# Patient Record
Sex: MALE | Race: OTHER | URBAN - METROPOLITAN AREA
[De-identification: names, ages, dates, MRNs, and addresses within clinical notes are randomized per-mention and may not be internally consistent; named-entity substitution may affect disease eponyms.]

---

## 2022-10-27 ENCOUNTER — EMERGENCY (EMERGENCY)
Facility: HOSPITAL | Age: 52
LOS: 0 days | Discharge: HOME | End: 2022-10-27
Attending: EMERGENCY MEDICINE | Admitting: EMERGENCY MEDICINE

## 2022-10-27 VITALS
HEART RATE: 66 BPM | DIASTOLIC BLOOD PRESSURE: 82 MMHG | SYSTOLIC BLOOD PRESSURE: 173 MMHG | RESPIRATION RATE: 17 BRPM | OXYGEN SATURATION: 98 % | TEMPERATURE: 98 F | WEIGHT: 180.78 LBS

## 2022-10-27 DIAGNOSIS — H57.12 OCULAR PAIN, LEFT EYE: ICD-10-CM
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
## 2022-10-27 DIAGNOSIS — S05.02XA INJURY OF CONJUNCTIVA AND CORNEAL ABRASION WITHOUT FOREIGN BODY, LEFT EYE, INITIAL ENCOUNTER: ICD-10-CM

## 2022-10-27 DIAGNOSIS — H57.89 OTHER SPECIFIED DISORDERS OF EYE AND ADNEXA: ICD-10-CM
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
## 2022-10-27 DIAGNOSIS — F17.200 NICOTINE DEPENDENCE, UNSPECIFIED, UNCOMPLICATED: ICD-10-CM

## 2022-10-27 PROCEDURE — 99283 EMERGENCY DEPT VISIT LOW MDM: CPT

## 2022-10-27 RX ORDER — POLYMYXIN B SULF/TRIMETHOPRIM 10000-1/ML
1 DROPS OPHTHALMIC (EYE) ONCE
Refills: 0 | Status: COMPLETED | OUTPATIENT
Start: 2022-10-27 | End: 2022-10-27

## 2022-10-27 RX ORDER — IBUPROFEN 200 MG
600 TABLET ORAL ONCE
Refills: 0 | Status: COMPLETED | OUTPATIENT
Start: 2022-10-27 | End: 2022-10-27

## 2022-10-27 RX ADMIN — Medication 600 MILLIGRAM(S): at 02:31

## 2022-10-27 RX ADMIN — Medication 1 DROP(S): at 02:31

## 2022-10-27 NOTE — ED PROVIDER NOTE - NSFOLLOWUPINSTRUCTIONS_ED_ALL_ED_FT
Corneal Abrasion    Please use given antibiotics eye drop (Polytrim)- 1-2 drops to affected eye every 4-6 hours for 5 days while awake.     The cornea is the clear covering at the front and center of the eye. This very thin tissue is made up of many layers. If a scratch or injury causes the corneal epithelium to come off, it is called a corneal abrasion. Symptoms include eye pain, difficulty keeping eye open, and light sensitivity. Do not drive or operate machinery if your eye is patched.    SEEK MEDICAL CARE IF YOU HAVE THE FOLLOWING SYMPTOMS: discharge from eyes, changes in vision, worsening of symptoms.

## 2022-10-27 NOTE — ED PROVIDER NOTE - CLINICAL SUMMARY MEDICAL DECISION MAKING FREE TEXT BOX
53 yo M, non diabetic, non contact wearer here for L eye pain -- worse with opening eye, blinking, began suddenly now with associated redness, tearing. Found to have corneal abrasion on stain. VS intact. No signs of globe rupture.    Will treat with polytrim. Advised on close monitoring, return precautions, follow up with ophtho.

## 2022-10-27 NOTE — ED PROVIDER NOTE - OBJECTIVE STATEMENT
53 yo male no sig hx present c/o left eye pain/tearing started few hours ago. denies known injury but he was rubbing his eye because the smoke of his cigarette was irritating his eye. light worsen the pain. denies HA/dizziness/ change of vision/ discharge.

## 2022-10-27 NOTE — ED PROVIDER NOTE - PATIENT PORTAL LINK FT
You can access the FollowMyHealth Patient Portal offered by [facility] by registering at the following website: [email or web address]. By joining [employer]’s FollowMyHealth portal, you will also be able to view your health information using other applications (apps) compatible with our system.

## 2022-10-27 NOTE — ED PROVIDER NOTE - PHYSICAL EXAMINATION
CONSTITUTIONAL: Well-appearing; well-nourished; in no apparent distress.   EYES: PERRL; EOM intact. VA OD 20/25 OS 20/25 OU 20/25  + small linear fluorescent uptake to the center of left cornea. negative Jennifer signs   SKIN: No skin changes to left eyelid. no FB noted underneath upper/lower eyelids   NEURO/PSYCH: A & O x 4; grossly unremarkable.

## 2022-10-27 NOTE — ED PROVIDER NOTE - NSFOLLOWUPCLINICS_GEN_ALL_ED_FT
[facility]  Ophthalmolgy  [address], [address]  [place] [address]  Phone: [phone]  Fax:

## 2023-03-12 ENCOUNTER — EMERGENCY (EMERGENCY)
Facility: HOSPITAL | Age: 53
LOS: 0 days | Discharge: HOME | End: 2023-03-13
Attending: EMERGENCY MEDICINE
Payer: MEDICAID

## 2023-03-12 VITALS
HEART RATE: 119 BPM | SYSTOLIC BLOOD PRESSURE: 182 MMHG | WEIGHT: 199.96 LBS | OXYGEN SATURATION: 96 % | HEIGHT: 68 IN | DIASTOLIC BLOOD PRESSURE: 105 MMHG | RESPIRATION RATE: 18 BRPM

## 2023-03-12 DIAGNOSIS — R07.89 OTHER CHEST PAIN: ICD-10-CM

## 2023-03-12 DIAGNOSIS — R42 DIZZINESS AND GIDDINESS: ICD-10-CM

## 2023-03-12 DIAGNOSIS — E83.42 HYPOMAGNESEMIA: ICD-10-CM

## 2023-03-12 DIAGNOSIS — Z20.822 CONTACT WITH AND (SUSPECTED) EXPOSURE TO COVID-19: ICD-10-CM

## 2023-03-12 DIAGNOSIS — F17.290 NICOTINE DEPENDENCE, OTHER TOBACCO PRODUCT, UNCOMPLICATED: ICD-10-CM

## 2023-03-12 DIAGNOSIS — R07.9 CHEST PAIN, UNSPECIFIED: ICD-10-CM

## 2023-03-12 DIAGNOSIS — R20.2 PARESTHESIA OF SKIN: ICD-10-CM

## 2023-03-12 DIAGNOSIS — F10.10 ALCOHOL ABUSE, UNCOMPLICATED: ICD-10-CM

## 2023-03-12 LAB
ALBUMIN SERPL ELPH-MCNC: 4.5 G/DL — SIGNIFICANT CHANGE UP (ref 3.5–5.2)
ALP SERPL-CCNC: 64 U/L — SIGNIFICANT CHANGE UP (ref 30–115)
ALT FLD-CCNC: 29 U/L — SIGNIFICANT CHANGE UP (ref 0–41)
ANION GAP SERPL CALC-SCNC: 12 MMOL/L — SIGNIFICANT CHANGE UP (ref 7–14)
APPEARANCE UR: CLEAR — SIGNIFICANT CHANGE UP
APTT BLD: 27.7 SEC — SIGNIFICANT CHANGE UP (ref 27–39.2)
AST SERPL-CCNC: 20 U/L — SIGNIFICANT CHANGE UP (ref 0–41)
BASE EXCESS BLDV CALC-SCNC: 2.7 MMOL/L — SIGNIFICANT CHANGE UP (ref -2–3)
BASOPHILS # BLD AUTO: 0.08 K/UL — SIGNIFICANT CHANGE UP (ref 0–0.2)
BASOPHILS NFR BLD AUTO: 0.9 % — SIGNIFICANT CHANGE UP (ref 0–1)
BILIRUB SERPL-MCNC: 0.3 MG/DL — SIGNIFICANT CHANGE UP (ref 0.2–1.2)
BILIRUB UR-MCNC: NEGATIVE — SIGNIFICANT CHANGE UP
BUN SERPL-MCNC: 14 MG/DL — SIGNIFICANT CHANGE UP (ref 10–20)
CA-I SERPL-SCNC: 1.22 MMOL/L — SIGNIFICANT CHANGE UP (ref 1.15–1.33)
CALCIUM SERPL-MCNC: 9.4 MG/DL — SIGNIFICANT CHANGE UP (ref 8.4–10.5)
CHLORIDE SERPL-SCNC: 102 MMOL/L — SIGNIFICANT CHANGE UP (ref 98–110)
CO2 SERPL-SCNC: 23 MMOL/L — SIGNIFICANT CHANGE UP (ref 17–32)
COLOR SPEC: COLORLESS — SIGNIFICANT CHANGE UP
CREAT SERPL-MCNC: 0.9 MG/DL — SIGNIFICANT CHANGE UP (ref 0.7–1.5)
DIFF PNL FLD: NEGATIVE — SIGNIFICANT CHANGE UP
EGFR: 101 ML/MIN/1.73M2 — SIGNIFICANT CHANGE UP
EOSINOPHIL # BLD AUTO: 0.04 K/UL — SIGNIFICANT CHANGE UP (ref 0–0.7)
EOSINOPHIL NFR BLD AUTO: 0.4 % — SIGNIFICANT CHANGE UP (ref 0–8)
FLUAV AG NPH QL: SIGNIFICANT CHANGE UP
FLUBV AG NPH QL: SIGNIFICANT CHANGE UP
GAS PNL BLDV: 134 MMOL/L — LOW (ref 136–145)
GAS PNL BLDV: SIGNIFICANT CHANGE UP
GLUCOSE SERPL-MCNC: 191 MG/DL — HIGH (ref 70–99)
GLUCOSE UR QL: NEGATIVE — SIGNIFICANT CHANGE UP
HCO3 BLDV-SCNC: 28 MMOL/L — SIGNIFICANT CHANGE UP (ref 22–29)
HCT VFR BLD CALC: 43.4 % — SIGNIFICANT CHANGE UP (ref 42–52)
HCT VFR BLDA CALC: 45 % — SIGNIFICANT CHANGE UP (ref 39–51)
HGB BLD CALC-MCNC: 15 G/DL — SIGNIFICANT CHANGE UP (ref 12.6–17.4)
HGB BLD-MCNC: 15.5 G/DL — SIGNIFICANT CHANGE UP (ref 14–18)
IMM GRANULOCYTES NFR BLD AUTO: 0.4 % — HIGH (ref 0.1–0.3)
INR BLD: 0.94 RATIO — SIGNIFICANT CHANGE UP (ref 0.65–1.3)
KETONES UR-MCNC: NEGATIVE — SIGNIFICANT CHANGE UP
LACTATE BLDV-MCNC: 2.4 MMOL/L — HIGH (ref 0.5–2)
LEUKOCYTE ESTERASE UR-ACNC: NEGATIVE — SIGNIFICANT CHANGE UP
LYMPHOCYTES # BLD AUTO: 1.74 K/UL — SIGNIFICANT CHANGE UP (ref 1.2–3.4)
LYMPHOCYTES # BLD AUTO: 19.5 % — LOW (ref 20.5–51.1)
MAGNESIUM SERPL-MCNC: 1.7 MG/DL — LOW (ref 1.8–2.4)
MCHC RBC-ENTMCNC: 31.2 PG — HIGH (ref 27–31)
MCHC RBC-ENTMCNC: 35.7 G/DL — SIGNIFICANT CHANGE UP (ref 32–37)
MCV RBC AUTO: 87.3 FL — SIGNIFICANT CHANGE UP (ref 80–94)
MONOCYTES # BLD AUTO: 0.44 K/UL — SIGNIFICANT CHANGE UP (ref 0.1–0.6)
MONOCYTES NFR BLD AUTO: 4.9 % — SIGNIFICANT CHANGE UP (ref 1.7–9.3)
NEUTROPHILS # BLD AUTO: 6.6 K/UL — HIGH (ref 1.4–6.5)
NEUTROPHILS NFR BLD AUTO: 73.9 % — SIGNIFICANT CHANGE UP (ref 42.2–75.2)
NITRITE UR-MCNC: NEGATIVE — SIGNIFICANT CHANGE UP
NRBC # BLD: 0 /100 WBCS — SIGNIFICANT CHANGE UP (ref 0–0)
NT-PROBNP SERPL-SCNC: 21 PG/ML — SIGNIFICANT CHANGE UP (ref 0–300)
PCO2 BLDV: 47 MMHG — SIGNIFICANT CHANGE UP (ref 42–55)
PH BLDV: 7.39 — SIGNIFICANT CHANGE UP (ref 7.32–7.43)
PH UR: 7.5 — SIGNIFICANT CHANGE UP (ref 5–8)
PLATELET # BLD AUTO: 223 K/UL — SIGNIFICANT CHANGE UP (ref 130–400)
PO2 BLDV: 47 MMHG — SIGNIFICANT CHANGE UP
POTASSIUM BLDV-SCNC: 3.9 MMOL/L — SIGNIFICANT CHANGE UP (ref 3.5–5.1)
POTASSIUM SERPL-MCNC: 4.2 MMOL/L — SIGNIFICANT CHANGE UP (ref 3.5–5)
POTASSIUM SERPL-SCNC: 4.2 MMOL/L — SIGNIFICANT CHANGE UP (ref 3.5–5)
PROT SERPL-MCNC: 7 G/DL — SIGNIFICANT CHANGE UP (ref 6–8)
PROT UR-MCNC: NEGATIVE — SIGNIFICANT CHANGE UP
PROTHROM AB SERPL-ACNC: 10.7 SEC — SIGNIFICANT CHANGE UP (ref 9.95–12.87)
RBC # BLD: 4.97 M/UL — SIGNIFICANT CHANGE UP (ref 4.7–6.1)
RBC # FLD: 11.9 % — SIGNIFICANT CHANGE UP (ref 11.5–14.5)
RSV RNA NPH QL NAA+NON-PROBE: SIGNIFICANT CHANGE UP
SAO2 % BLDV: 79 % — SIGNIFICANT CHANGE UP
SARS-COV-2 RNA SPEC QL NAA+PROBE: SIGNIFICANT CHANGE UP
SODIUM SERPL-SCNC: 137 MMOL/L — SIGNIFICANT CHANGE UP (ref 135–146)
SP GR SPEC: 1.03 — SIGNIFICANT CHANGE UP (ref 1.01–1.03)
TROPONIN T SERPL-MCNC: <0.01 NG/ML — SIGNIFICANT CHANGE UP
UROBILINOGEN FLD QL: SIGNIFICANT CHANGE UP
WBC # BLD: 8.94 K/UL — SIGNIFICANT CHANGE UP (ref 4.8–10.8)
WBC # FLD AUTO: 8.94 K/UL — SIGNIFICANT CHANGE UP (ref 4.8–10.8)

## 2023-03-12 PROCEDURE — 71045 X-RAY EXAM CHEST 1 VIEW: CPT | Mod: 26

## 2023-03-12 PROCEDURE — 85025 COMPLETE CBC W/AUTO DIFF WBC: CPT

## 2023-03-12 PROCEDURE — 83735 ASSAY OF MAGNESIUM: CPT

## 2023-03-12 PROCEDURE — 84295 ASSAY OF SERUM SODIUM: CPT

## 2023-03-12 PROCEDURE — 96375 TX/PRO/DX INJ NEW DRUG ADDON: CPT | Mod: XU

## 2023-03-12 PROCEDURE — 83605 ASSAY OF LACTIC ACID: CPT

## 2023-03-12 PROCEDURE — 70496 CT ANGIOGRAPHY HEAD: CPT | Mod: MA

## 2023-03-12 PROCEDURE — 82803 BLOOD GASES ANY COMBINATION: CPT

## 2023-03-12 PROCEDURE — 36415 COLL VENOUS BLD VENIPUNCTURE: CPT

## 2023-03-12 PROCEDURE — 85014 HEMATOCRIT: CPT

## 2023-03-12 PROCEDURE — 0042T: CPT | Mod: MA

## 2023-03-12 PROCEDURE — 85610 PROTHROMBIN TIME: CPT

## 2023-03-12 PROCEDURE — 96365 THER/PROPH/DIAG IV INF INIT: CPT | Mod: XU

## 2023-03-12 PROCEDURE — 83880 ASSAY OF NATRIURETIC PEPTIDE: CPT

## 2023-03-12 PROCEDURE — 85730 THROMBOPLASTIN TIME PARTIAL: CPT

## 2023-03-12 PROCEDURE — 70551 MRI BRAIN STEM W/O DYE: CPT | Mod: MA

## 2023-03-12 PROCEDURE — 99285 EMERGENCY DEPT VISIT HI MDM: CPT | Mod: 25

## 2023-03-12 PROCEDURE — G0378: CPT

## 2023-03-12 PROCEDURE — 96361 HYDRATE IV INFUSION ADD-ON: CPT

## 2023-03-12 PROCEDURE — 93005 ELECTROCARDIOGRAM TRACING: CPT

## 2023-03-12 PROCEDURE — 0241U: CPT

## 2023-03-12 PROCEDURE — 70498 CT ANGIOGRAPHY NECK: CPT | Mod: MA

## 2023-03-12 PROCEDURE — 83036 HEMOGLOBIN GLYCOSYLATED A1C: CPT

## 2023-03-12 PROCEDURE — 81003 URINALYSIS AUTO W/O SCOPE: CPT

## 2023-03-12 PROCEDURE — 80061 LIPID PANEL: CPT

## 2023-03-12 PROCEDURE — 75574 CT ANGIO HRT W/3D IMAGE: CPT | Mod: MA

## 2023-03-12 PROCEDURE — 82962 GLUCOSE BLOOD TEST: CPT

## 2023-03-12 PROCEDURE — 85018 HEMOGLOBIN: CPT

## 2023-03-12 PROCEDURE — 84132 ASSAY OF SERUM POTASSIUM: CPT

## 2023-03-12 PROCEDURE — 80053 COMPREHEN METABOLIC PANEL: CPT

## 2023-03-12 PROCEDURE — 70496 CT ANGIOGRAPHY HEAD: CPT | Mod: 26,MA

## 2023-03-12 PROCEDURE — 82330 ASSAY OF CALCIUM: CPT

## 2023-03-12 PROCEDURE — 70450 CT HEAD/BRAIN W/O DYE: CPT | Mod: 26,MA,59

## 2023-03-12 PROCEDURE — 71045 X-RAY EXAM CHEST 1 VIEW: CPT

## 2023-03-12 PROCEDURE — 70450 CT HEAD/BRAIN W/O DYE: CPT | Mod: MA

## 2023-03-12 PROCEDURE — 96366 THER/PROPH/DIAG IV INF ADDON: CPT

## 2023-03-12 PROCEDURE — 84484 ASSAY OF TROPONIN QUANT: CPT

## 2023-03-12 PROCEDURE — 99223 1ST HOSP IP/OBS HIGH 75: CPT

## 2023-03-12 PROCEDURE — 93010 ELECTROCARDIOGRAM REPORT: CPT

## 2023-03-12 PROCEDURE — 70498 CT ANGIOGRAPHY NECK: CPT | Mod: 26,MA

## 2023-03-12 RX ORDER — KETOROLAC TROMETHAMINE 30 MG/ML
15 SYRINGE (ML) INJECTION ONCE
Refills: 0 | Status: DISCONTINUED | OUTPATIENT
Start: 2023-03-12 | End: 2023-03-12

## 2023-03-12 RX ORDER — SODIUM CHLORIDE 9 MG/ML
1000 INJECTION, SOLUTION INTRAVENOUS ONCE
Refills: 0 | Status: COMPLETED | OUTPATIENT
Start: 2023-03-12 | End: 2023-03-12

## 2023-03-12 RX ORDER — MAGNESIUM SULFATE 500 MG/ML
2 VIAL (ML) INJECTION ONCE
Refills: 0 | Status: COMPLETED | OUTPATIENT
Start: 2023-03-12 | End: 2023-03-12

## 2023-03-12 RX ORDER — ASPIRIN/CALCIUM CARB/MAGNESIUM 324 MG
81 TABLET ORAL DAILY
Refills: 0 | Status: DISCONTINUED | OUTPATIENT
Start: 2023-03-12 | End: 2023-03-13

## 2023-03-12 RX ORDER — DEXAMETHASONE 0.5 MG/5ML
8 ELIXIR ORAL ONCE
Refills: 0 | Status: COMPLETED | OUTPATIENT
Start: 2023-03-12 | End: 2023-03-12

## 2023-03-12 RX ADMIN — SODIUM CHLORIDE 1000 MILLILITER(S): 9 INJECTION, SOLUTION INTRAVENOUS at 19:36

## 2023-03-12 RX ADMIN — Medication 2 GRAM(S): at 23:30

## 2023-03-12 RX ADMIN — Medication 15 MILLIGRAM(S): at 19:36

## 2023-03-12 RX ADMIN — Medication 25 GRAM(S): at 21:13

## 2023-03-12 RX ADMIN — SODIUM CHLORIDE 1000 MILLILITER(S): 9 INJECTION, SOLUTION INTRAVENOUS at 20:45

## 2023-03-12 RX ADMIN — Medication 15 MILLIGRAM(S): at 20:05

## 2023-03-12 RX ADMIN — Medication 8 MILLIGRAM(S): at 21:13

## 2023-03-12 NOTE — ED ADULT NURSE NOTE - NSIMPLEMENTINTERV_GEN_ALL_ED
Implemented All Fall with Harm Risk Interventions:  [place] to call system. Call bell, personal items and telephone within reach. Instruct patient to call for assistance. Room bathroom lighting operational. Non-slip footwear when patient is off stretcher. Physically safe environment: no spills, clutter or unnecessary equipment. Stretcher in lowest position, wheels locked, appropriate side rails in place. Provide visual cue, wrist band, yellow gown, etc. Monitor gait and stability. Monitor for mental status changes and reorient to person, place, and time. Review medications for side effects contributing to fall risk. Reinforce activity limits and safety measures with patient and family. Provide visual clues: red socks.

## 2023-03-12 NOTE — ED CDU PROVIDER INITIAL DAY NOTE - CLINICAL SUMMARY MEDICAL DECISION MAKING FREE TEXT BOX
Labs and EKG were ordered and reviewed.  Imaging was ordered and reviewed by me.  Appropriate medications for patient's presenting complaints were ordered and effects were reassessed.  Patient's records (prior hospital, ED visit, and/or nursing home notes if available) were reviewed.  Additional history was obtained from EMS, family, and/or PCP (where available).  Escalation to admission/observation was considered.  Patient requires hospitalization - monitored setting in obs for complete cardiac and neuro workup.  Well appearing at this point and asymptomatic.

## 2023-03-12 NOTE — CONSULT NOTE ADULT - ASSESSMENT
53 yo M with PMH of EtOH abuse (2-4 shots/day, last drink 2 months ago) presenting for sudden onset dizziness and L sided face tingling that started 2 hours PTA while patient was watching tv with son.Patient LKW 4:30 pm.  Upon presentation patient is AOX3 base line as per son who was translating in Belarusian. NIH 0 MRscore 2.  Patient denies weakness, no aphasia no slurred speech, no n/v. States feels room is spinning worse when lays down denies change in vision, ringing in left ear. No recent illness. Vitals: /96  WBC wnl. CT head with no significant acute findings.  CT perfusion with no perfusion deficit. CTA H/N No large vessel occlusion, aneurysm, or vascular malformation. Mild atherosclerotic stenosis in the distal left CCA (10%). Patient not a candidate for IV thrombolytics, no IA intervention, NIH 0.     # Etiology may be related to possible TIA vs Stroke   MRV  OBs  BP G 120-180  Asa 81 mg  TTE  Labs: Lipid, A1c   May consider vestibular functionality w/u  Check possible orthostatics  Case discussed with Telestroke and  Dr. Mccormick            51 yo M with PMH of EtOH abuse (2-4 shots/day, last drink 2 months ago) presenting for sudden onset dizziness and L sided face tingling that started 2 hours PTA while patient was watching tv with son.Patient LKW 4:30 pm.  Upon presentation patient is AOX3 base line as per son who was translating in Setswana. NIH 0 MRscore 0.  Patient denies weakness, no aphasia no slurred speech, no n/v. States feels room is spinning worse when lays down denies change in vision, ringing in left ear. No recent illness. Vitals: /96  WBC wnl. CT head with no significant acute findings.  CT perfusion with no perfusion deficit. CTA H/N No large vessel occlusion, aneurysm, or vascular malformation. Mild atherosclerotic stenosis in the distal left CCA (10%). Patient not a candidate for IV thrombolytics, no IA intervention, NIH 0.     # Etiology may be related to possible TIA vs Stroke   MRV  OBs  BP G 120-180  Asa 81 mg  Glycemic control  TTE  Labs: Lipid, A1c   May consider vestibular functionality w/u  Check possible orthostatics  Stroke risk stratifications  Case discussed with Telestroke and  Dr. Mccormick            51 yo M with PMH of EtOH abuse (2-4 shots/day, last drink 2 months ago) presenting for sudden onset dizziness and L sided face tingling that started 2 hours PTA while patient was watching tv with son.Patient LKW 4:30 pm.  Upon presentation patient is AOX3 base line as per son who was translating in Divehi. NIH 0 MRscore 0.  Patient denies weakness, no aphasia no slurred speech, no n/v. States feels room is spinning worse when lays down denies change in vision, ringing in left ear. No recent illness. Vitals: /96  WBC wnl. CT head with no significant acute findings.  CT perfusion with no perfusion deficit. CTA H/N No large vessel occlusion, aneurysm, or vascular malformation. Mild atherosclerotic stenosis in the distal left CCA (10%). Patient not a candidate for IV thrombolytics, no IA intervention, NIH 0.     # Etiology may be related to possible TIA vs Stroke   MRI Brain  without louise  / MRV  OBs  BP G 120-180  Asa 81 mg  Glycemic control  TTE  Labs: Lipid, A1c   May consider vestibular functionality w/u  Check possible orthostatics  Stroke risk stratifications  Case discussed with Telestroke and  Dr. Mccormick            51 yo M with PMH of EtOH abuse (2-4 shots/day, last drink 2 months ago) presenting for sudden onset dizziness and L sided face tingling that started 2 hours PTA while patient was watching tv with son.Patient LKW 4:30 pm.  Upon presentation patient is AOX3 base line as per son who was translating in Latvian. NIH 0 MRscore 0.  Patient denies weakness, no aphasia no slurred speech, no n/v. States feels room is spinning worse when lays down denies change in vision, ringing in left ear. No recent illness. Vitals: /96  WBC wnl. CT head with no significant acute findings.  CT perfusion with no perfusion deficit. CTA H/N No large vessel occlusion, aneurysm, or vascular malformation. Mild atherosclerotic stenosis in the distal left CCA (10%). Patient not a candidate for IV thrombolytics, no IA intervention, NIH 0.     # Etiology may be related to possible TIA vs Stroke   MRI Brain  without louise   OBs  BP G 120-180  Asa 81 mg  Glycemic control  TTE  Labs: Lipid, A1c   May consider vestibular functionality w/u  Check possible orthostatics  Stroke risk stratifications  Case discussed with Telestroke and  Dr. Mccormick

## 2023-03-12 NOTE — ED CDU PROVIDER INITIAL DAY NOTE - OBJECTIVE STATEMENT
53 yo M with PMH of EtOH abuse (2-4 shots/day, last drink 2 months ago) presenting for sudden onset dizziness and L sided face tingling that started 2 hours PTA while patient was watching tv with son. Patient also endorses chronic non-radiating retrosternal chest pain that got more constant this morning, non-exertional. Patient denies fever, cough, congestion, headache, vision changes, n/w/t, SOB, NVD, dysuria, fall/HT/LOC, drug/alcohol use.

## 2023-03-12 NOTE — ED PROVIDER NOTE - CARE PLAN
1 Principal Discharge DX:	Chest pain  Secondary Diagnosis:	Dizziness  Secondary Diagnosis:	Hypomagnesemia

## 2023-03-12 NOTE — STROKE CODE NOTE - MRS SCORE
[id number] [address] EMERGENCY DEPT 
[id number] [facility] 
[place] [facility] [address] 
[phone] Work/School Note Date: 2/13/2020 To Whom It May concern: 
 
Shilo Tobias was seen and treated today in the emergency room by the following provider(s): 
Attending Provider: Rocio Gray MD. Shilo Tobias may return to work on 2/16/2020.  
 
Sincerely, 
 
 
 
 
  
 
 
 (1) No significant disability. Able to carry out all usual activities, despite some symptoms.

## 2023-03-12 NOTE — CONSULT NOTE ADULT - SUBJECTIVE AND OBJECTIVE BOX
Neurology  Consult Note  03-12-23    Name:  OSBALDO MAJOR; 52y (1970)    Reason for Admission:     Chief Complaint: Sudden onset dizziness, ringing left ear, Left sided face tingling   HPI:    53 yo M with PMH of EtOH abuse (2-4 shots/day, last drink 2 months ago) presenting for sudden onset dizziness and L sided face tingling that started 2 hours PTA while patient was watching tv with son. Patient also endorses chronic non-radiating retrosternal chest pain that got more constant this morning, non-exertional. Patient denies fever, cough, congestion, headache, vision changes, n/w/t, SOB, NVD, dysuria, fall/HT/LOC. Patient LKW 4:30 pm. Stroke code activated and neurology consulted for above complaint. Upon presentation patient is AOX3 base line as per son who was translating in Mohawk. Patient denies weakness, no aphasia no slurred speech, no n/v. States feels room is spinning worse when lays down denies change in vision, ringing in left ear. No recent illness.             PMHx:   No pertinent past medical history    PFHx:     PSuHx:     Medications:  MEDICATIONS  (STANDING):    MEDICATIONS  (PRN):    Vitals:  T(C): 37.2 (03-12-23 @ 19:12), Max: 37.2 (03-12-23 @ 19:12)  HR: 91 (03-12-23 @ 21:15) (91 - 127)  BP: 145/82 (03-12-23 @ 21:15) (145/82 - 182/105)  RR: 16 (03-12-23 @ 21:15) (16 - 19)  SpO2: 97% (03-12-23 @ 21:15) (96% - 98%)    Labs:                        15.5   8.94  )-----------( 223      ( 12 Mar 2023 18:51 )             43.4     03-12    137  |  102  |  14  ----------------------------<  191<H>  4.2   |  23  |  0.9    Ca    9.4      12 Mar 2023 18:51  Mg     1.7     03-12    TPro  7.0  /  Alb  4.5  /  TBili  0.3  /  DBili  x   /  AST  20  /  ALT  29  /  AlkPhos  64  03-12    CAPILLARY BLOOD GLUCOSE      POCT Blood Glucose.: 207 mg/dL (12 Mar 2023 18:12)    LIVER FUNCTIONS - ( 12 Mar 2023 18:51 )  Alb: 4.5 g/dL / Pro: 7.0 g/dL / ALK PHOS: 64 U/L / ALT: 29 U/L / AST: 20 U/L / GGT: x             PT/INR - ( 12 Mar 2023 18:51 )   PT: 10.70 sec;   INR: 0.94 ratio         PTT - ( 12 Mar 2023 18:51 )  PTT:27.7 sec      PHYSICAL EXAMINATION:  General: Well-developed, well nourished, in no acute distress.  Eyes: Conjunctiva and sclera clear.  Neurologic:  - Mental Status:  Alert, awake, oriented to person, place, and time; Speech is fluent in Azeri with intact naming, repetition.     - Cranial Nerves II-XII:    II:  Visual fields are full to confrontation; Pupils are equal, round, and reactive to light.  III, IV, VI:  Extraocular movements are intact without nystagmus.  V:  Facial sensation is intact in the V1-V3 distribution bilaterally.  VII:  Face is symmetric with normal eye closure and smile  VIII:  Hearing is intact to finger rub.  IX, X:  Uvula is midline and soft palate rises symmetrically  XI:  Head turning and shoulder shrug are intact.  XII:  Tongue protrudes in the midline.  - Motor:  Strength is 5/5 throughout.  There is no pronator drift.  Normal muscle bulk and tone throughout.  - Reflexes:  2+ and symmetric at the biceps, triceps, brachioradialis, knees, and ankles.  Plantar responses flexor.  - Sensory:  Intact throughout to light touch.  - Coordination:  Finger-nose-finger and heel-knee-shin intact without dysmetria.  - Gait:   Normal steps, base, arm swing, and turning.  Heel and toe walking are normal, no veering to r/l.    Radiology:    < from: CT Brain Stroke Protocol (03.12.23 @ 18:23) >  FINDINGS:  The ventricles, basal cisterns and sulcal pattern are within normal   limits.    There is no acute intracranial hemorrhage, extra-axial fluid collections   or midline shift.    There is no depressed calvarial fracture. The mastoid air cells are   aerated.  The paranasal sinuses are aerated.    Beam hardening artifact is noted overlying the brain stem and posterior   fossa which is inherent to CT in this location.    IMPRESSION:    No acute intracranial hemorrhage, mass-effect or midline shift.    < end of copied text >  < from: CT Brain Perfusion Maps Stroke (03.12.23 @ 18:36) >  CT PERFUSION:  No perfusion deficits to suggest areas of completed infarction or at risk   territory.    < end of copied text >  < from: CT Angio Brain Stroke Protocol  w/ IV Cont (03.12.23 @ 18:46) >  CTA HEAD/NECK:    AORTIC ARCH: There are mild calcific atherosclerotic plaques without   flow-limiting stenosis.    RIGHT ANTERIOR CIRCULATION:  The common carotid artery (CCA) is patent up to the bulb without   stenosis. There is mild noncalcific atherosclerosis in the carotid bulb   without flow-limitingstenosis. The external carotid artery (ECA) and its   proximal branches are patent without stenosis. The cervical internal   carotid artery (ICA) is patent without stenosis. The intracranial   internal carotid artery is patent without stenosis.    The anterior and middle cerebral arteries (AVELINO/MCA) are patent without   stenosis.      LEFT ANTERIOR CIRCULATION:  There is mild noncalcific plaque in the distal CCA resulting in mild   stenosis (10%). There is mild noncalcific/calcific atherosclerosisin the   carotid bulb without flow-limiting stenosis. The ECA and its proximal   branches are patent without stenosis. The cervical ICA is patent without   stenosis. The intracranial ICA is patent without stenosis.    The anterior and middle cerebralarteries are patent without stenosis.      POSTERIOR CIRCULATION:  The vertebral arteries are patent without stenosis bilaterally.  The   basilar artery is patent without stenosis. The proximal branch   vasculature of the posterior circulation are within normal limits.    The posterior cerebral arteries are patent without stenosis.    There is no evidence for saccular aneurysm, vascular malformation, or   large vessel occlusion.    No filling defects of the dural venous sinuses or deep cerebral veins.      NONVASCULAR FINDINGS:  Please see separately dictated CT head for the intracranial findings.    There is prominent posterior osteophyte at C6-7.    < end of copied text >

## 2023-03-12 NOTE — ED PROVIDER NOTE - OBJECTIVE STATEMENT
53 yo M with PMH of EtOH abuse (2-4 shots/day, last drink 2 months ago) presenting for sudden onset dizziness and L sided face tingling that started 2 hours PTA while patient was watching tv with son. Patient denies fever, cough, congestion, headache, vision changes, n/w/t, CP, SOB, NVD, dysuria, fall/HT/LOC, drug/alcohol use. 53 yo M with PMH of EtOH abuse (2-4 shots/day, last drink 2 months ago) presenting for sudden onset dizziness and L sided face tingling that started 2 hours PTA while patient was watching tv with son. Patient also endorses chronic non-radiating retrosternal chest pain that got more constant this morning, non-exertional. Patient denies fever, cough, congestion, headache, vision changes, n/w/t, SOB, NVD, dysuria, fall/HT/LOC, drug/alcohol use.

## 2023-03-12 NOTE — CONSULT NOTE ADULT - NS ATTEND AMEND GEN_ALL_CORE FT
Patient seen and examined and agree with above except as noted.  Patients history, notes ,labs, imaging, vitals and meds reviewed personally.  Patients dizziness has improved but is not completely gone.  Notices it when turning head or standing up  NIHSS 0  mrankin 0    Plan as above  If MRI brain show no acute infarct can follow as an out patient with ENT  Glycemic control with PMD  Atorvastatin can be lowered to home dose

## 2023-03-12 NOTE — ED PROVIDER NOTE - CLINICAL SUMMARY MEDICAL DECISION MAKING FREE TEXT BOX
I immediately evaluated the patient on arrival to the ED. 52M with PMH EtOH abuse (drinks 2-4 shots of liquor per day, has been sober x2 months), who p/w dizziness described as spinning 2hrs PTA. Sx actually improved with ambulation and worse with laying still. Associated with L sided paresthesias. Reports tinnitis (L ear) for some time now. Denies hearing loss, focal weakness, headache, visual changes, vomiting, neck pain. Also reports unchanged chest pain for "a long time" non-exertional, non-radiating. Denies FHx CAD and he does not smoke. Has never seen a cardiologist. vs noted, triage note reviewed. Gen - NAD, Head - NCAT, Pharynx - clear, MMM, Heart - RRR, no m/g/r, Lungs - CTAB, no w/c/r, Abdomen - soft, NT, ND, Skin - No rash, Extremities - FROM, no edema, erythema, ecchymosis, brisk cap refill, Neuro - A&O x3, CN 2-12 intact, normal finger to nose, normal romberg, stable gait, no sensory or motor deficits, Alert, oriented, grossly unremarkable. No Focal deficits. GCS 15. NIH 1 for decr sensation to light touch L V2 distribution. HCT, CTA head/neck, CTP personally reviewed. All labs, ekg personally reviewed. Discussed with neurology. Pt feels much better on reassessment. Neuro exam NF, negative rhomberg, no nystagmus on exam. pt placed in obs for MR brain, CCTA chest.

## 2023-03-12 NOTE — CONSULT NOTE ADULT - NS_MD_PANP_GEN_ALL_CORE
Update given to patients mother at this time.   Attending and PA/NP shared services statement (NON-critical care):

## 2023-03-12 NOTE — ED PROVIDER NOTE - PHYSICAL EXAMINATION
CONSTITUTIONAL: well-appearing, in NAD  SKIN: Warm dry, normal skin turgor  HEAD: NCAT  EYES: EOMI, PERRLA, no scleral icterus, conjunctiva pink  ENT: normal pharynx with no erythema or exudates  NECK: Supple; non tender. Full ROM.  CARD: RRR, no murmurs.  RESP: clear to ausculation b/l. No crackles or wheezing.  ABD: soft, non-tender, non-distended, no rebound or guarding.  EXT: Full ROM, no bony tenderness, no pedal edema, no calf tenderness  NEURO: normal motor. normal sensory. CN II-XII intact. Cerebellar testing normal. Normal gait. NIHSS1 for decreased sensation in V2 distribution  PSYCH: Cooperative, appropriate.

## 2023-03-12 NOTE — ED ADULT NURSE NOTE - OBJECTIVE STATEMENT
pt 51 y/o male c/p dizziness and left sided face tingling with left ear ringing since 430 pm , pt also c/p chest pain

## 2023-03-13 VITALS
DIASTOLIC BLOOD PRESSURE: 68 MMHG | HEART RATE: 66 BPM | TEMPERATURE: 98 F | RESPIRATION RATE: 18 BRPM | SYSTOLIC BLOOD PRESSURE: 134 MMHG | OXYGEN SATURATION: 98 %

## 2023-03-13 PROBLEM — Z78.9 OTHER SPECIFIED HEALTH STATUS: Chronic | Status: ACTIVE | Noted: 2022-10-27

## 2023-03-13 LAB
A1C WITH ESTIMATED AVERAGE GLUCOSE RESULT: 5.7 % — HIGH (ref 4–5.6)
CHOLEST SERPL-MCNC: 244 MG/DL — HIGH
ESTIMATED AVERAGE GLUCOSE: 117 MG/DL — HIGH (ref 68–114)
HDLC SERPL-MCNC: 38 MG/DL — LOW
LIPID PNL WITH DIRECT LDL SERPL: 152 MG/DL — HIGH
NON HDL CHOLESTEROL: 206 MG/DL — HIGH
TRIGL SERPL-MCNC: 271 MG/DL — HIGH
TROPONIN T SERPL-MCNC: <0.01 NG/ML — SIGNIFICANT CHANGE UP

## 2023-03-13 PROCEDURE — 93010 ELECTROCARDIOGRAM REPORT: CPT

## 2023-03-13 PROCEDURE — 99283 EMERGENCY DEPT VISIT LOW MDM: CPT

## 2023-03-13 PROCEDURE — 70551 MRI BRAIN STEM W/O DYE: CPT | Mod: 26,MA

## 2023-03-13 PROCEDURE — 75574 CT ANGIO HRT W/3D IMAGE: CPT | Mod: 26,MA

## 2023-03-13 PROCEDURE — 93010 ELECTROCARDIOGRAM REPORT: CPT | Mod: 77

## 2023-03-13 PROCEDURE — 99238 HOSP IP/OBS DSCHRG MGMT 30/<: CPT

## 2023-03-13 RX ORDER — ASPIRIN/CALCIUM CARB/MAGNESIUM 324 MG
1 TABLET ORAL
Qty: 30 | Refills: 0
Start: 2023-03-13 | End: 2023-04-11

## 2023-03-13 RX ORDER — METOPROLOL TARTRATE 50 MG
100 TABLET ORAL ONCE
Refills: 0 | Status: COMPLETED | OUTPATIENT
Start: 2023-03-13 | End: 2023-03-13

## 2023-03-13 RX ORDER — ATORVASTATIN CALCIUM 80 MG/1
1 TABLET, FILM COATED ORAL
Qty: 30 | Refills: 0
Start: 2023-03-13 | End: 2023-04-11

## 2023-03-13 RX ORDER — THIAMINE MONONITRATE (VIT B1) 100 MG
100 TABLET ORAL ONCE
Refills: 0 | Status: COMPLETED | OUTPATIENT
Start: 2023-03-13 | End: 2023-03-13

## 2023-03-13 RX ADMIN — Medication 100 MILLIGRAM(S): at 06:12

## 2023-03-13 RX ADMIN — Medication 100 MILLIGRAM(S): at 07:49

## 2023-03-13 RX ADMIN — Medication 100 MILLIGRAM(S): at 07:50

## 2023-03-13 RX ADMIN — Medication 81 MILLIGRAM(S): at 11:29

## 2023-03-13 NOTE — ED CDU PROVIDER DISPOSITION NOTE - CLINICAL COURSE
53 yo M with PMH of EtOH abuse (2-4 shots/day, last drink 2 months ago) presenting for sudden onset dizziness and L sided face tingling that started 2 hours PTA while patient was watching tv with son. Patient also endorses chronic non-radiating retrosternal chest pain that got more constant this morning, non-exertional.  Patient was stroke code. Paitent had complete neuro and cardiac workup.  Mild CAD.  Will start meds and refer for outpatient cardio eval.

## 2023-03-13 NOTE — ED ADULT NURSE REASSESSMENT NOTE - NS ED NURSE REASSESS COMMENT FT1
pt assessed A&OX3, VSS pt remains on cardiac monitor at this time NIH 0 plan of care discussed with pt ; safety & comfort measures maintained. pt remains on bed alarm ;

## 2023-03-13 NOTE — ED CDU PROVIDER DISPOSITION NOTE - PROVIDER TOKENS
FREE:[LAST:[follow up with your primary medical doctor],PHONE:[(   )    -],FAX:[(   )    -],FOLLOWUP:[4-6 Days]],PROVIDER:[TOKEN:[[id number]],FOLLOWUP:[4-6 Days]]

## 2023-03-13 NOTE — ED CDU PROVIDER DISPOSITION NOTE - ATTENDING CONTRIBUTION TO CARE
I personally evaluated the patient. I reviewed the Resident’s or Physician Assistant’s note (as assigned above), and agree with the findings and plan except as documented in my note.  see clinical course

## 2023-03-13 NOTE — ED CDU PROVIDER SUBSEQUENT DAY NOTE - CLINICAL SUMMARY MEDICAL DECISION MAKING FREE TEXT BOX
Labs and EKG were ordered and reviewed.  Imaging was ordered and reviewed by me.  Appropriate medications for patient's presenting complaints were ordered and effects were reassessed.  Patient's records (prior hospital, ED visit, and/or nursing home notes if available) were reviewed.  Additional history was obtained from EMS, family, and/or PCP (where available).  Escalation to admission/observation was considered.  Patient requires hospitalization - monitored setting in obs for complete cardiac and neuro workup.

## 2023-03-13 NOTE — ED CDU PROVIDER DISPOSITION NOTE - CARE PROVIDERS DIRECT ADDRESSES
,DirectAddress_Unknown,[email or web address]

## 2023-03-13 NOTE — ED CDU PROVIDER DISPOSITION NOTE - CARE PROVIDER_API CALL
follow up with your primary medical doctor,   Phone: (   )    -  Fax: (   )    -  Follow Up Time: 4-6 Days    Rod Llamas (MD)  Cardiovascular Disease; Internal Medicine; Interventional Cardiology  [address] [place] [address]  Phone: [phone]  Fax: [phone]  Follow Up Time: 4-6 Days

## 2023-03-13 NOTE — ED CDU PROVIDER DISPOSITION NOTE - PATIENT PORTAL LINK FT
You can access the FollowMyHealth Patient Portal offered by [facility] by registering at the following website: [email or web address]. By joining [employer]’s FollowMyHealth portal, you will also be able to view your health information using other applications (apps) compatible with our system.

## 2023-03-13 NOTE — ED CDU PROVIDER DISPOSITION NOTE - NSFOLLOWUPINSTRUCTIONS_ED_ALL_ED_FT
Chest Pain    Chest pain can be caused by many different conditions which may or may not be dangerous. Causes include heartburn, lung infections, heart attack, blood clot in lungs, skin infections, strain or damage to muscle, cartilage, or bones, etc. In addition to a history and physical examination, an electrocardiogram (ECG) or other lab tests may have been performed to determine the cause of your chest pain. Follow up with your primary care provider or with a cardiologist as instructed.     SEEK IMMEDIATE MEDICAL CARE IF YOU HAVE ANY OF THE FOLLOWING SYMPTOMS: worsening chest pain, coughing up blood, unexplained back/neck/jaw pain, severe abdominal pain, dizziness or lightheadedness, fainting, shortness of breath, sweaty or clammy skin, vomiting, or racing heart beat. These symptoms may represent a serious problem that is an emergency. Do not wait to see if the symptoms will go away. Get medical help right away. Call 911 and do not drive yourself to the hospital.      Coronary Artery Disease, Male      Coronary artery disease (CAD) is a condition in which the arteries that lead to the heart (coronary arteries) become narrow or blocked. The narrowing or blockage can lead to decreased blood flow to the heart. Prolonged reduced blood flow can cause a heart attack (myocardial infarction, or MI). This condition may also be called coronary heart disease.    CAD is the most common type of heart disease, and heart disease is the leading cause of death in men. It is important to understand what causes CAD and how it is treated.      What are the causes?  Series of blood vessels showing a gradual buildup of plaque that causes the blood vessel to become narrow and then blocked.   CAD is most often caused by atherosclerosis. This is the buildup of fat and cholesterol (plaque) on the inside of the arteries. Over time, the plaque may narrow or block the artery, reducing blood flow to the heart. Plaque can also become weak and break off within a coronary artery and cause a sudden blockage. Other less common causes of CAD include:  •A blood clot or a piece of another substance that blocks the flow of blood in a coronary artery (embolism).      •A tearing of the artery (spontaneous coronary artery dissection).      •An enlargement of an artery (aneurysm).      •Inflammation (vasculitis) in the artery wall.        What increases the risk?    The following factors may make you more likely to develop this condition:  •Age. Men older than 45 years are at a greater risk of CAD.      •Family history of CAD.      •High blood pressure (hypertension).      •Diabetes.      •High cholesterol levels.      •Obesity.      Other risk factors include:  •Tobacco use.      •Excessive alcohol use.      •Lack of exercise.      •A diet high in saturated and trans fats, such as fried food and processed meat.        What are the signs or symptoms?    Many people do not have any symptoms during the early stages of CAD. As the condition progresses, symptoms may include:•Chest pain (angina). The pain can:  •Feel like crushing or squeezing, or like a tightness, pressure, fullness, or heaviness in the chest.      •Last more than a few minutes or can stop and recur. The pain tends to get worse with exercise or stress and to fade with rest.        •Pain in the arms, neck, jaw, ear, or back.      •Unexplained heartburn or indigestion.      •Shortness of breath.      •Nausea or vomiting.      •Sudden light-headedness.      •Sudden cold sweats.      •Fluttering or fast heartbeat (palpitations).        How is this diagnosed?    This condition is diagnosed based on:  •Your family and medical history.      •A physical exam.    •Tests. These may include:  •A test to check the electrical signals in your heart (electrocardiogram).      •Exercise stress test. This looks for signs of blockage when the heart is stressed with exercise, such as running on a treadmill.      •Pharmacologic stress test. This test looks for signs of blockage when the heart is being stressed with a medicine.      •Blood tests to check levels of cardiac enzymes such as troponin and creatine kinase.      •Coronary angiogram. This is a procedure to look at the coronary arteries to see if there is any blockage. During this test, a dye is injected into your arteries so they appear on an X-ray.      •Coronary artery CT scan. This scan helps detect calcium deposits in your coronary arteries. Calcium deposits are an indicator of CAD.      •A test that uses sound waves to take a picture of your heart (echocardiogram).          How is this treated?    This condition may be treated by:  •Healthy lifestyle changes to reduce risk factors.    •Medicines such as:  •Antiplatelet medicines such as clopidogrel or aspirin. These help to prevent blood clots.      •Nitroglycerin.      •Blood pressure medicines.      •Cholesterol-lowering medicine.        •Coronary angioplasty and stenting. During this procedure, a thin, flexible tube is inserted through a blood vessel and into a blocked artery. A balloon or similar device on the end of the tube is inflated to open up the artery. In some cases, a small, mesh tube (stent) is inserted into the artery to keep it open.      •Coronary artery bypass surgery. During this surgery, veins or arteries from other parts of the body are used to create a bypass around the blockage and allow blood to reach your heart.        Follow these instructions at home:    Medicines     •Take over-the-counter and prescription medicines only as told by your health care provider.    • Do not take the following medicines unless your health care provider approves:  •NSAIDs, such as ibuprofen, naproxen, or celecoxib.      •Vitamin supplements that contain vitamin A, vitamin E, or both.        Lifestyle     •Follow an exercise program approved by your health care provider. Ask your health care provider if cardiac rehab is appropriate.      •Maintain a healthy weight or lose weight as approved by your health care provider.      •Learn to manage stress or try to limit your stress. Ask your health care provider for suggestions if you need help.      •Get screened for depression and seek treatment, if needed.      • Do not use any products that contain nicotine or tobacco. These products include cigarettes, chewing tobacco, and vaping devices, such as e-cigarettes. If you need help quitting, ask your health care provider.        Eating and drinking   A plate with examples of foods in a healthy diet.   •Follow a heart-healthy diet. A dietitian can help educate you about healthy food options and changes. In general, eat plenty of fruits and vegetables, lean meats, and whole grains.    •Avoid foods high in:  •Sugar.      •Salt (sodium).      •Saturated fat, such as processed or fatty meat.      •Trans fat, such as fried foods.        •Use healthy cooking methods such as roasting, grilling, broiling, baking, poaching, steaming, or stir-frying.      • Do not drink alcohol if your health care provider tells you not to drink.    •If you drink alcohol:  •Limit how much you have to 0–2 drinks a day.      •Know how much alcohol is in your drink. In the [place] one drink equals one 12 oz bottle of beer (355 mL), one 5 oz glass of wine (148 mL), or one 1½ oz glass of hard liquor (44 mL).        General instructions     •Manage any other health conditions, such as high cholesterol, hypertension, and diabetes. These conditions affect your heart.      •Your health care provider may ask you to monitor your blood pressure.      •Keep all follow-up visits. This is important.        Get help right away if:  •You have pain in your chest, neck, ear, arm, jaw, stomach, or back that:  •Lasts more than a few minutes.      •Is recurring.      •Is not relieved by taking medicine under your tongue (sublingual nitroglycerin).        •You have profuse sweating without cause.    •You have unexplained:  •Heartburn or indigestion.      •Shortness of breath or difficulty breathing.      •Fluttering or fast heartbeat (palpitations).      •Nausea or vomiting.      •Fatigue or weakness.      •Feelings of nervousness or anxiety.        •You have sudden light-headedness or dizziness.      •You faint.      These symptoms may be an emergency. Get help right away. Call 911.   • Do not wait to see if the symptoms will go away.        • Do not drive yourself to the hospital.         Summary    •Coronary artery disease (CAD) is a condition in which the arteries that lead to the heart (coronary arteries) become narrow or blocked. Prolonged reduced blood flow can cause a heart attack.      •CAD can be treated with lifestyle changes, medicines, coronary angioplasty or stents, coronary artery bypass surgery, or a combination of these treatments.      •Keep all follow-up visits. This is important.      This information is not intended to replace advice given to you by your health care provider. Make sure you discuss any questions you have with your health care provider.      Our Emergency Department Referral Coordinators will be reaching out to you in the next 24-48 hours from 9:00am to 5:00pm with a follow up appointment. Please expect a phone call from the hospital in that time frame. If you do not receive a call or if you have any questions or concerns, you can reach them at   [phone]    Begin taking aspirin and atorvastatin daily  Follow up with cardiology

## 2023-03-18 PROBLEM — Z00.00 ENCOUNTER FOR PREVENTIVE HEALTH EXAMINATION: Status: ACTIVE | Noted: 2023-03-18

## 2023-08-07 NOTE — ED ADULT TRIAGE NOTE - AS TEMP SITE
[facility]    2023 9:25 AM    Assessment/Plan:    Patient presents today with subtherapeutic  INR.    Assessment of patient findings and chart review: no significant changes noted since last INR    Recommendation for patient's warfarin regimen: Boost dose today to 2.5mg then increase maintenance dose    Recommend repeat INR in 3 days  _________________________________________________________________    Wei Hutson (56 y.o.) is followed by the [facility].    Anticoagulation Summary  As of 2023      INR goal:  2.0-3.0   TTR:  75.1 % (1.6 y)   INR used for dosin.9 (2023)   Warfarin maintenance plan:  1 mg (1 mg x 1) every Sun, Tue, Thu; 2 mg (1 mg x 2) all other days   Weekly warfarin total:  11 mg   Plan last modified:  Tania Cazares, PharmD (2023)   Next INR check:  8/10/2023   Target end date:      Indications    LVAD (left ventricular assist device) present [Z95.811]                 Anticoagulation Episode Summary       INR check location:      Preferred lab:      Send INR reminders to:  [facility] COUMADIN LVAD    Comments:  LVAD // [facility] only  & Hospital Lab on           Anticoagulation Care Providers       Provider Role Specialty Phone number    Miguel Mahoney MD [facility] Cardiology [phone]                 oral